# Patient Record
(demographics unavailable — no encounter records)

---

## 2025-02-18 NOTE — HISTORY OF PRESENT ILLNESS
[de-identified] : Runny nose [FreeTextEntry6] : Mother states pt had runny nose and is here for doc note for missing school. No new symptoms reported. no fever during visit improved eating and drinking ok, peeing regularly, no emesis, no diarrhea  no meds, no allergies

## 2025-02-18 NOTE — HISTORY OF PRESENT ILLNESS
[de-identified] : Runny nose [FreeTextEntry6] : Mother states pt had runny nose and is here for doc note for missing school. No new symptoms reported. no fever during visit improved eating and drinking ok, peeing regularly, no emesis, no diarrhea  no meds, no allergies

## 2025-02-18 NOTE — DISCUSSION/SUMMARY
[FreeTextEntry1] :   - Acute URI: Hope demonstrated minor symptoms of a common cold, characterized by a mild cough and a runny nose. However, all symptoms have resolved and she is currently asymptomatic.     - Therapeutic Interventions: No treatment is necessary as the patient is currently symptom-free.     - Diagnostic Tests: No additional tests are required.     - Referrals: No further referrals are necessary.     - Patient Education: Reassured the parent that minor illnesses with quick recovery are normal as the child grows and is exposed to more things.     - Follow-Up: No specific follow-up is required. Advise to return if symptoms recur or the patient develops new symptoms.